# Patient Record
Sex: FEMALE | Race: WHITE | Employment: UNEMPLOYED | URBAN - METROPOLITAN AREA
[De-identification: names, ages, dates, MRNs, and addresses within clinical notes are randomized per-mention and may not be internally consistent; named-entity substitution may affect disease eponyms.]

---

## 2018-10-17 ENCOUNTER — HOSPITAL ENCOUNTER (OUTPATIENT)
Dept: MAMMOGRAPHY | Age: 43
Discharge: HOME | End: 2018-10-17
Attending: FAMILY MEDICINE
Payer: SELF-PAY

## 2018-10-17 DIAGNOSIS — Z12.31 VISIT FOR SCREENING MAMMOGRAM: ICD-10-CM

## 2018-10-17 PROCEDURE — 77067 SCR MAMMO BI INCL CAD: CPT

## 2020-02-24 ENCOUNTER — HOSPITAL ENCOUNTER (OUTPATIENT)
Dept: MAMMOGRAPHY | Age: 45
Discharge: HOME | End: 2020-02-24
Attending: NURSE PRACTITIONER
Payer: SELF-PAY

## 2020-02-24 DIAGNOSIS — Z12.31 VISIT FOR SCREENING MAMMOGRAM: ICD-10-CM

## 2020-02-24 PROCEDURE — 77063 BREAST TOMOSYNTHESIS BI: CPT

## 2021-10-07 ENCOUNTER — TRANSCRIBE ORDER (OUTPATIENT)
Dept: SCHEDULING | Age: 46
End: 2021-10-07

## 2021-10-07 DIAGNOSIS — Z12.31 ENCOUNTER FOR SCREENING MAMMOGRAM FOR MALIGNANT NEOPLASM OF BREAST: Primary | ICD-10-CM

## 2021-10-12 ENCOUNTER — TRANSCRIBE ORDER (OUTPATIENT)
Dept: REGISTRATION | Age: 46
End: 2021-10-12

## 2021-10-12 DIAGNOSIS — Z12.31 VISIT FOR SCREENING MAMMOGRAM: Primary | ICD-10-CM

## 2021-11-16 ENCOUNTER — HOSPITAL ENCOUNTER (OUTPATIENT)
Dept: MAMMOGRAPHY | Age: 46
Discharge: HOME | End: 2021-11-16
Attending: OBSTETRICS & GYNECOLOGY

## 2021-11-16 DIAGNOSIS — Z12.31 VISIT FOR SCREENING MAMMOGRAM: ICD-10-CM

## 2021-11-16 PROCEDURE — 77063 BREAST TOMOSYNTHESIS BI: CPT

## 2021-12-07 ENCOUNTER — HOSPITAL ENCOUNTER (OUTPATIENT)
Dept: MAMMOGRAPHY | Age: 46
Discharge: HOME | End: 2021-12-07
Attending: OBSTETRICS & GYNECOLOGY

## 2021-12-07 DIAGNOSIS — R92.8 ABNORMAL SCREENING MAMMOGRAM: ICD-10-CM

## 2021-12-07 PROCEDURE — 76642 ULTRASOUND BREAST LIMITED: CPT

## 2021-12-07 PROCEDURE — 77065 DX MAMMO INCL CAD UNI: CPT

## 2023-03-01 ENCOUNTER — OFFICE VISIT (OUTPATIENT)
Dept: FAMILY MEDICINE CLINIC | Facility: CLINIC | Age: 48
End: 2023-03-01

## 2023-03-01 VITALS
WEIGHT: 160.8 LBS | DIASTOLIC BLOOD PRESSURE: 72 MMHG | SYSTOLIC BLOOD PRESSURE: 100 MMHG | OXYGEN SATURATION: 99 % | HEIGHT: 69 IN | HEART RATE: 86 BPM | TEMPERATURE: 98.3 F | BODY MASS INDEX: 23.82 KG/M2 | RESPIRATION RATE: 18 BRPM

## 2023-03-01 DIAGNOSIS — R22.41 LOCALIZED SWELLING, MASS, OR LUMP OF RIGHT LOWER EXTREMITY: Primary | ICD-10-CM

## 2023-03-01 DIAGNOSIS — L98.9 SKIN LESIONS: ICD-10-CM

## 2023-03-01 DIAGNOSIS — E55.9 VITAMIN D DEFICIENCY: ICD-10-CM

## 2023-03-01 DIAGNOSIS — Z13.1 DIABETES MELLITUS SCREENING: ICD-10-CM

## 2023-03-01 DIAGNOSIS — N39.0 FREQUENT UTI: ICD-10-CM

## 2023-03-01 DIAGNOSIS — E04.1 THYROID NODULE: ICD-10-CM

## 2023-03-01 DIAGNOSIS — Z13.220 SCREENING CHOLESTEROL LEVEL: ICD-10-CM

## 2023-03-01 DIAGNOSIS — J30.89 ALLERGIC RHINITIS DUE TO OTHER ALLERGIC TRIGGER, UNSPECIFIED SEASONALITY: ICD-10-CM

## 2023-03-01 LAB
BASOPHILS # BLD: 0.1 K/UL (ref 0–0.2)
BASOPHILS NFR BLD: 1 % (ref 0–2)
DIFFERENTIAL METHOD BLD: NORMAL
EOSINOPHIL # BLD: 0.3 K/UL (ref 0–0.8)
EOSINOPHIL NFR BLD: 4 % (ref 0.5–7.8)
ERYTHROCYTE [DISTWIDTH] IN BLOOD BY AUTOMATED COUNT: 13.2 % (ref 11.9–14.6)
HCT VFR BLD AUTO: 40.6 % (ref 35.8–46.3)
HGB BLD-MCNC: 13.8 G/DL (ref 11.7–15.4)
IMM GRANULOCYTES # BLD AUTO: 0 K/UL (ref 0–0.5)
IMM GRANULOCYTES NFR BLD AUTO: 0 % (ref 0–5)
LYMPHOCYTES # BLD: 1.8 K/UL (ref 0.5–4.6)
LYMPHOCYTES NFR BLD: 27 % (ref 13–44)
MCH RBC QN AUTO: 30.1 PG (ref 26.1–32.9)
MCHC RBC AUTO-ENTMCNC: 34 G/DL (ref 31.4–35)
MCV RBC AUTO: 88.6 FL (ref 82–102)
MONOCYTES # BLD: 0.7 K/UL (ref 0.1–1.3)
MONOCYTES NFR BLD: 10 % (ref 4–12)
NEUTS SEG # BLD: 3.8 K/UL (ref 1.7–8.2)
NEUTS SEG NFR BLD: 58 % (ref 43–78)
NRBC # BLD: 0 K/UL (ref 0–0.2)
PLATELET # BLD AUTO: 221 K/UL (ref 150–450)
PMV BLD AUTO: 10.5 FL (ref 9.4–12.3)
RBC # BLD AUTO: 4.58 M/UL (ref 4.05–5.2)
WBC # BLD AUTO: 6.6 K/UL (ref 4.3–11.1)

## 2023-03-01 PROCEDURE — 99205 OFFICE O/P NEW HI 60 MIN: CPT | Performed by: FAMILY MEDICINE

## 2023-03-01 RX ORDER — NITROFURANTOIN MACROCRYSTALS 50 MG/1
50 CAPSULE ORAL PRN
Qty: 30 CAPSULE | Refills: 5 | Status: SHIPPED | OUTPATIENT
Start: 2023-03-01

## 2023-03-01 SDOH — ECONOMIC STABILITY: HOUSING INSECURITY
IN THE LAST 12 MONTHS, WAS THERE A TIME WHEN YOU DID NOT HAVE A STEADY PLACE TO SLEEP OR SLEPT IN A SHELTER (INCLUDING NOW)?: NO

## 2023-03-01 SDOH — ECONOMIC STABILITY: FOOD INSECURITY: WITHIN THE PAST 12 MONTHS, YOU WORRIED THAT YOUR FOOD WOULD RUN OUT BEFORE YOU GOT MONEY TO BUY MORE.: NEVER TRUE

## 2023-03-01 SDOH — ECONOMIC STABILITY: FOOD INSECURITY: WITHIN THE PAST 12 MONTHS, THE FOOD YOU BOUGHT JUST DIDN'T LAST AND YOU DIDN'T HAVE MONEY TO GET MORE.: NEVER TRUE

## 2023-03-01 SDOH — ECONOMIC STABILITY: INCOME INSECURITY: HOW HARD IS IT FOR YOU TO PAY FOR THE VERY BASICS LIKE FOOD, HOUSING, MEDICAL CARE, AND HEATING?: NOT HARD AT ALL

## 2023-03-01 ASSESSMENT — ANXIETY QUESTIONNAIRES
5. BEING SO RESTLESS THAT IT IS HARD TO SIT STILL: 0
2. NOT BEING ABLE TO STOP OR CONTROL WORRYING: 0
7. FEELING AFRAID AS IF SOMETHING AWFUL MIGHT HAPPEN: 0
4. TROUBLE RELAXING: 0
GAD7 TOTAL SCORE: 0
6. BECOMING EASILY ANNOYED OR IRRITABLE: 0
3. WORRYING TOO MUCH ABOUT DIFFERENT THINGS: 0
1. FEELING NERVOUS, ANXIOUS, OR ON EDGE: 0

## 2023-03-01 ASSESSMENT — ENCOUNTER SYMPTOMS
COUGH: 0
SHORTNESS OF BREATH: 0

## 2023-03-01 ASSESSMENT — PATIENT HEALTH QUESTIONNAIRE - PHQ9
1. LITTLE INTEREST OR PLEASURE IN DOING THINGS: 0
SUM OF ALL RESPONSES TO PHQ QUESTIONS 1-9: 0
SUM OF ALL RESPONSES TO PHQ9 QUESTIONS 1 & 2: 0
SUM OF ALL RESPONSES TO PHQ QUESTIONS 1-9: 0
2. FEELING DOWN, DEPRESSED OR HOPELESS: 0

## 2023-03-01 NOTE — PROGRESS NOTES
Tiara Luevano (: 1975) is a 50 y.o. female, new patient, here for evaluation of the following chief complaint(s):  New Patient       ASSESSMENT/PLAN:  1. Localized swelling, mass, or lump of right lower extremity  -     XR TIBIA FIBULA RIGHT (2 VIEWS); Future  2. Allergic rhinitis due to other allergic trigger, unspecified seasonality  3. Thyroid nodule  -     TSH; Future  4. Screening cholesterol level  -     Lipid Panel; Future  5. Diabetes mellitus screening  -     Hemoglobin A1C; Future  6. Vitamin D deficiency  -     Vitamin D 25 Hydroxy; Future  7. Frequent UTI  -     CBC with Auto Differential; Future  -     Comprehensive Metabolic Panel; Future  -     nitrofurantoin (MACRODANTIN) 50 MG capsule; Take 1 capsule by mouth as needed (UTI), Disp-30 capsule, R-5Normal  8. Skin lesions  -     External Referral to Dermatology    Has a localized protrusion, right fibula, will check x-ray first.  Will monitor closely. No pain or tenderness. We will check basic blood work, does follow with endocrinology for yearly ultrasounds to monitor thyroid nodule. But has not had her TSH checked in some time. Continue Macrobid as needed for frequent UTIs. Will refer to dermatology for skin check patient request.    Has upcoming colonoscopy, states up-to-date on mammograms and Pap smears per OB/GYN. Return if symptoms worsen or fail to improve. SUBJECTIVE/OBJECTIVE:  HPI    51-year-old overall female who presents to [facility] care. She has history of thyroid nodules that been biopsied in the past, she gets yearly ultrasounds to monitor this. She is on baby aspirin because she was seen by hematology and told she may have protein S deficiency, they are not 100% sure. She has had a full hysterectomy, but does get Pap smears with her OB/GYN still. She has an upcoming colonoscopy scheduled. She would like blood work checked today.     She has a history of ocular migraines, which were worse when she was younger. These seem to improve during her childbearing years but have now come back. They are very sporadic, her last one was about 3 weeks ago. She says she will go long time without 1 at all and then have multiple ones in the road. They usually resolve with some Excedrin. They are usually triggered by bright lights. She is getting some hot flashes, she did discuss with her OB/GYN going on hormones, she does not want to do that. She has a referral to dermatology, Dr. Mason Olivas. She has a lump on her right shin. This has been there for some time, has not grown is not painful. She never had evaluated before. She does need refill on her Macrobid, she does get frequent UTIs following sexual intercourse. She takes this as needed. PMH   has a past medical history of Allergic rhinitis, Oligomenorrhea, Protein S deficiency ([address] Thyroid cyst, and Thyroid nodule. Past Surgical History:   Procedure Laterality Date    APPENDECTOMY  2009    DILATION AND CURETTAGE OF UTERUS  1997    of uterus    GYN  2010    Hysteroscopy    OTHER SURGICAL HISTORY  1999, 2000,2002, 2004    Vaginal delivery    [address] [facility]. surgery       Current Outpatient Medications on File Prior to Visit   Medication Sig Dispense Refill    aspirin 81 MG EC tablet Take 81 mg by mouth daily      fexofenadine (ALLEGRA) 180 MG tablet Take by mouth       No current facility-administered medications on file prior to visit.        Social History     Socioeconomic History    Marital status:      Spouse name: Not on file    Number of children: Not on file    Years of education: Not on file    Highest education level: Not on file   Occupational History    Not on file   Tobacco Use    Smoking status: Never    Smokeless tobacco: Never   Substance and Sexual Activity    Alcohol use: No    Drug use: No    Sexual activity: Not on file   Other Topics Concern    Not on file   Social History Narrative    Not on file     Social Determinants of Health     Financial Resource Strain: Low Risk     Difficulty of Paying Living Expenses: Not hard at all   Food Insecurity: No Food Insecurity    Worried About Running Out of Food in the Last Year: Never true    Ran Out of Food in the Last Year: Never true   Transportation Needs: Unknown    Lack of Transportation (Medical): Not on file    Lack of Transportation (Non-Medical): No   Physical Activity: Not on file   Stress: Not on file   Social Connections: Not on file   Intimate Partner Violence: Not on file   Housing Stability: Unknown    Unable to Pay for Housing in the Last Year: Not on file    Number of Places Lived in the Last Year: Not on file    Unstable Housing in the Last Year: No       Family History   Problem Relation Age of Onset    Malig Hypertherm Neg Hx     Coronary Art Dis Maternal Grandfather     Coronary Art Dis Paternal Grandfather     Heart Failure Paternal Grandmother     Alzheimer's Disease Paternal Grandmother     Pseudochol. Deficiency Neg Hx     Delayed Awakening Neg Hx     Post-op Nausea/Vomiting Neg Hx     Emergence Delirium Neg Hx     Post-op Cognitive Dysfunction Neg Hx     Other Neg Hx     Thyroid Cancer Neg Hx     Breast Cancer Neg Hx     Migraines Mother     Elevated Lipids Father     Elevated Lipids Brother     No Known Problems Brother          ALLERGIES  Allergies   Allergen Reactions    Hydromorphone Shortness Of Breath    Sulfa Antibiotics Hives     Other reaction(s): Hives/Swelling-A       PREVIOUS PRIMARY CARE PROVIDER  Misty Reyes MD      RECORDS    Provided by patient:      Review of Systems   Constitutional:  Negative for activity change, appetite change, fever and unexpected weight change.   Respiratory:  Negative for cough and shortness of breath.    Cardiovascular:  Negative for chest pain and palpitations.   Skin:  Negative for rash.   Neurological:  Negative for dizziness and headaches.   Psychiatric/Behavioral:  Negative for sleep  disturbance. Physical Exam  Vitals reviewed. Constitutional:       General: She is not in acute distress. Appearance: Normal appearance. She is not ill-appearing or toxic-appearing. HENT:      Head: Normocephalic and atraumatic. Eyes:      Conjunctiva/sclera: Conjunctivae normal.   Cardiovascular:      Rate and Rhythm: Normal rate and regular rhythm. Heart sounds: Normal heart sounds. No murmur heard. No friction rub. No gallop. Pulmonary:      Effort: Pulmonary effort is normal. No respiratory distress. Breath sounds: Normal breath sounds. No wheezing, rhonchi or rales. Musculoskeletal:         General: No swelling. Normal range of motion. Legs:    Skin:     General: Skin is warm. Findings: No rash. Neurological:      Mental Status: She is alert. Mental status is at baseline. Psychiatric:         Mood and Affect: Mood normal.       Vitals:    03/01/23 1017   BP: 100/72   Pulse: 86   Resp: 18   Temp: 98.3 °F (36.8 °C)   SpO2: 99%        On this date, I spent 60 minutes reviewing previous notes, test results and face to face with the patient discussing the diagnosis and importance of compliance with the treatment plan as well as documenting on the day of the visit. No LOS data to display        An electronic signature was used to authenticate this note.   -- Pierrette Gaucher, MD

## 2023-03-02 LAB
25(OH)D3 SERPL-MCNC: 36.3 NG/ML (ref 30–100)
ALBUMIN SERPL-MCNC: 4.2 G/DL (ref 3.5–5)
ALBUMIN/GLOB SERPL: 1.2 (ref 0.4–1.6)
ALP SERPL-CCNC: 32 U/L (ref 50–136)
ALT SERPL-CCNC: 17 U/L (ref 12–65)
ANION GAP SERPL CALC-SCNC: 4 MMOL/L (ref 2–11)
AST SERPL-CCNC: 9 U/L (ref 15–37)
BILIRUB SERPL-MCNC: 0.6 MG/DL (ref 0.2–1.1)
BUN SERPL-MCNC: 14 MG/DL (ref 6–23)
CALCIUM SERPL-MCNC: 9.3 MG/DL (ref 8.3–10.4)
CHLORIDE SERPL-SCNC: 107 MMOL/L (ref 101–110)
CHOLEST SERPL-MCNC: 161 MG/DL
CO2 SERPL-SCNC: 25 MMOL/L (ref 21–32)
CREAT SERPL-MCNC: 0.8 MG/DL (ref 0.6–1)
EST. AVERAGE GLUCOSE BLD GHB EST-MCNC: 91 MG/DL
GLOBULIN SER CALC-MCNC: 3.6 G/DL (ref 2.8–4.5)
GLUCOSE SERPL-MCNC: 80 MG/DL (ref 65–100)
HBA1C MFR BLD: 4.8 % (ref 4.8–5.6)
HDLC SERPL-MCNC: 48 MG/DL (ref 40–60)
HDLC SERPL: 3.4
LDLC SERPL CALC-MCNC: 95 MG/DL
POTASSIUM SERPL-SCNC: 3.7 MMOL/L (ref 3.5–5.1)
PROT SERPL-MCNC: 7.8 G/DL (ref 6.3–8.2)
SODIUM SERPL-SCNC: 136 MMOL/L (ref 133–143)
TRIGL SERPL-MCNC: 90 MG/DL (ref 35–150)
TSH, 3RD GENERATION: 3 UIU/ML (ref 0.36–3.74)
VLDLC SERPL CALC-MCNC: 18 MG/DL (ref 6–23)

## 2023-03-06 ENCOUNTER — TELEPHONE (OUTPATIENT)
Dept: FAMILY MEDICINE CLINIC | Facility: CLINIC | Age: 48
End: 2023-03-06

## 2023-03-06 NOTE — TELEPHONE ENCOUNTER
----- Message from Chang Lockett MD sent at 3/6/2023  8:26 AM EST -----  Please call patient and let them know all of the resulting labs are normal and reassuring.

## 2023-03-06 NOTE — TELEPHONE ENCOUNTER
Contacted patient. Advised of results and provider comments. Verbalized understanding. No questions.      COURTNEY CHE

## 2023-04-26 ENCOUNTER — TELEPHONE (OUTPATIENT)
Dept: FAMILY MEDICINE CLINIC | Facility: CLINIC | Age: 48
End: 2023-04-26

## 2023-04-26 NOTE — TELEPHONE ENCOUNTER
Patient is having upper right quadrant pain. ECC called and stated the patient is looking for an urgent appointment. The MA spoke to the patient and informed her we do not have any urgent appointments at this time and informed to go to urgent care or the emergency department. She stated that pain was not too bad, that she has had this for a while  and can wait for an appointment.       Patient made an appointment for 5/1/23 for upper right quadrant pain

## 2023-05-01 ENCOUNTER — HOSPITAL ENCOUNTER (OUTPATIENT)
Dept: MAMMOGRAPHY | Age: 48
Discharge: HOME | End: 2023-05-04

## 2023-05-01 ENCOUNTER — OFFICE VISIT (OUTPATIENT)
Dept: FAMILY MEDICINE CLINIC | Facility: CLINIC | Age: 48
End: 2023-05-01

## 2023-05-01 VITALS
RESPIRATION RATE: 16 BRPM | WEIGHT: 159 LBS | OXYGEN SATURATION: 97 % | HEIGHT: 69 IN | TEMPERATURE: 98.8 F | BODY MASS INDEX: 23.55 KG/M2 | DIASTOLIC BLOOD PRESSURE: 70 MMHG | HEART RATE: 71 BPM | SYSTOLIC BLOOD PRESSURE: 120 MMHG

## 2023-05-01 DIAGNOSIS — Z12.31 ENCOUNTER FOR SCREENING MAMMOGRAM FOR MALIGNANT NEOPLASM OF BREAST: ICD-10-CM

## 2023-05-01 DIAGNOSIS — R10.31 COLICKY RLQ ABDOMINAL PAIN: Primary | ICD-10-CM

## 2023-05-01 DIAGNOSIS — Z87.442 HISTORY OF KIDNEY STONES: ICD-10-CM

## 2023-05-01 DIAGNOSIS — J30.1 SEASONAL ALLERGIC RHINITIS DUE TO POLLEN: ICD-10-CM

## 2023-05-01 LAB
BILIRUBIN, URINE, POC: NEGATIVE
BLOOD URINE, POC: NORMAL
GLUCOSE URINE, POC: NEGATIVE
KETONES, URINE, POC: NEGATIVE
LEUKOCYTE ESTERASE, URINE, POC: NEGATIVE
NITRITE, URINE, POC: NEGATIVE
PH, URINE, POC: 5 (ref 4.6–8)
PROTEIN,URINE, POC: NEGATIVE
SPECIFIC GRAVITY, URINE, POC: 1.02 (ref 1–1.03)
URINALYSIS CLARITY, POC: CLEAR
URINALYSIS COLOR, POC: YELLOW
UROBILINOGEN, POC: NORMAL

## 2023-05-01 PROCEDURE — 99214 OFFICE O/P EST MOD 30 MIN: CPT | Performed by: PHYSICIAN ASSISTANT

## 2023-05-01 PROCEDURE — 77067 SCR MAMMO BI INCL CAD: CPT

## 2023-05-01 PROCEDURE — 81003 URINALYSIS AUTO W/O SCOPE: CPT | Performed by: PHYSICIAN ASSISTANT

## 2023-05-01 RX ORDER — MONTELUKAST SODIUM 10 MG/1
10 TABLET ORAL DAILY
Qty: 90 TABLET | Refills: 1 | Status: SHIPPED | OUTPATIENT
Start: 2023-05-01

## 2023-05-01 RX ORDER — TAMSULOSIN HYDROCHLORIDE 0.4 MG/1
0.4 CAPSULE ORAL DAILY
Qty: 5 CAPSULE | Refills: 0 | Status: SHIPPED | OUTPATIENT
Start: 2023-05-01 | End: 2023-05-06

## 2023-07-17 ENCOUNTER — PATIENT MESSAGE (OUTPATIENT)
Dept: FAMILY MEDICINE CLINIC | Facility: CLINIC | Age: 48
End: 2023-07-17

## 2023-07-17 ENCOUNTER — OFFICE VISIT (OUTPATIENT)
Dept: FAMILY MEDICINE CLINIC | Facility: CLINIC | Age: 48
End: 2023-07-17
Payer: COMMERCIAL

## 2023-07-17 VITALS
HEART RATE: 81 BPM | SYSTOLIC BLOOD PRESSURE: 96 MMHG | BODY MASS INDEX: 23.31 KG/M2 | TEMPERATURE: 97.9 F | WEIGHT: 157.4 LBS | HEIGHT: 69 IN | OXYGEN SATURATION: 96 % | DIASTOLIC BLOOD PRESSURE: 58 MMHG

## 2023-07-17 DIAGNOSIS — K57.92 ACUTE DIVERTICULITIS: ICD-10-CM

## 2023-07-17 DIAGNOSIS — N83.201 CYST OF RIGHT OVARY: Primary | ICD-10-CM

## 2023-07-17 PROCEDURE — 99214 OFFICE O/P EST MOD 30 MIN: CPT | Performed by: FAMILY MEDICINE

## 2023-07-17 RX ORDER — KETOROLAC TROMETHAMINE 10 MG/1
10 TABLET, FILM COATED ORAL EVERY 6 HOURS PRN
COMMUNITY
Start: 2023-07-14

## 2023-07-17 RX ORDER — CIPROFLOXACIN 500 MG/1
TABLET, FILM COATED ORAL
COMMUNITY
Start: 2023-07-13

## 2023-07-17 RX ORDER — SODIUM, POTASSIUM,MAG SULFATES 17.5-3.13G
SOLUTION, RECONSTITUTED, ORAL ORAL
COMMUNITY
Start: 2023-04-26

## 2023-07-17 RX ORDER — AMOXICILLIN AND CLAVULANATE POTASSIUM 875; 125 MG/1; MG/1
TABLET, FILM COATED ORAL
COMMUNITY
Start: 2023-07-14

## 2023-07-17 ASSESSMENT — PATIENT HEALTH QUESTIONNAIRE - PHQ9
SUM OF ALL RESPONSES TO PHQ QUESTIONS 1-9: 0
SUM OF ALL RESPONSES TO PHQ9 QUESTIONS 1 & 2: 0
SUM OF ALL RESPONSES TO PHQ QUESTIONS 1-9: 0
1. LITTLE INTEREST OR PLEASURE IN DOING THINGS: 0
2. FEELING DOWN, DEPRESSED OR HOPELESS: 0

## 2023-07-17 NOTE — PROGRESS NOTES
Tiara Luevano (: 1975) is a 50 y.o. female, established patient, here for evaluation of the following chief complaint(s):  Follow-Up from Hospital (Diverticulitis/), Results (CT scan /), Referral - General (Has not heard from GI ), Lesion(s) (On liver and on bilateral kidney  right kidney has kidney (one stone)), and Cyst (Ovary right)       ASSESSMENT/PLAN:  1. Cyst of right ovary  -     [address] [place] [address]  2. Acute diverticulitis  -     External Referral To Gastroenterology    ER followup, patient seen today for follow-up emergency department for visit for diverticulitis,  Given Cipro and Flagyl, Flagyl cause dizziness, still taking Cipro, and Augmentin,  Ovarian cyst seen on CAT scan as well, will refer patient to GYN, as requested,  Referral to GI for diverticulitis follow-up,  Avoid certain foods such as nuts, berries, popcorn, drink plenty fluids, finish antibiotics. SUBJECTIVE/OBJECTIVE:  HPI  See above    Physical Exam  Vitals and nursing note reviewed. Constitutional:       General: She is not in acute distress. Appearance: Normal appearance. She is not ill-appearing. HENT:      Head: Normocephalic and atraumatic. Right Ear: External ear normal.      Left Ear: External ear normal.      Nose: Nose normal.      Mouth/Throat:      Mouth: Mucous membranes are dry. Eyes:      Extraocular Movements: Extraocular movements intact. Pupils: Pupils are equal, round, and reactive to light. Cardiovascular:      Rate and Rhythm: Normal rate. Pulses: Normal pulses. Pulmonary:      Effort: Pulmonary effort is normal.      Breath sounds: Normal breath sounds. Abdominal:      General: There is no distension. Musculoskeletal:         General: Normal range of motion. Cervical back: Normal range of motion and neck supple. Skin:     General: Skin is warm and dry. Neurological:      General: No focal deficit present.       Mental Status: She is

## 2023-07-18 DIAGNOSIS — N83.201 CYST OF RIGHT OVARY: Primary | ICD-10-CM

## 2023-08-01 ENCOUNTER — OFFICE VISIT (OUTPATIENT)
Dept: OBGYN CLINIC | Age: 48
End: 2023-08-01
Payer: COMMERCIAL

## 2023-08-01 VITALS
BODY MASS INDEX: 23.31 KG/M2 | HEIGHT: 69 IN | WEIGHT: 157.4 LBS | SYSTOLIC BLOOD PRESSURE: 100 MMHG | DIASTOLIC BLOOD PRESSURE: 60 MMHG

## 2023-08-01 DIAGNOSIS — N20.0 RENAL STONE: ICD-10-CM

## 2023-08-01 DIAGNOSIS — N83.201 CYST OF RIGHT OVARY: Primary | ICD-10-CM

## 2023-08-01 PROCEDURE — 99214 OFFICE O/P EST MOD 30 MIN: CPT | Performed by: OBSTETRICS & GYNECOLOGY

## 2023-08-01 PROCEDURE — 76830 TRANSVAGINAL US NON-OB: CPT | Performed by: OBSTETRICS & GYNECOLOGY

## 2023-08-01 NOTE — PROGRESS NOTES
Dimitris Singh  is a 50 y.o. female, No obstetric history on file. .  No LMP recorded. Patient has had a hysterectomy. , who is being seen for possible ovarian cyst.   She has been having rlq pain for a while now. She had bad pain and that is what lead to CT. She had diverticulitis. Now sill with rlq pain. Known kidney stone but never had a kidney stone pass. Does not have urologist.  Her Ct shows 3.4cm cyst.  Us today with 4cm ovary and 1.5 cm mass behind it. No solid masses or free fluid. TVUS today:   ROV cyst seen on CT  S/P hysterectomy   Bilateral adnexal WNL  LT ovary WNL  RT ovary larger than LT  ?mass posterior to ovary = 1.5 x 1.6 x 1.6 cm  Possible hemorrhagic exophytic cyst vs solid mass  There is significant vascularity noted to this area. HISTORY:    OB History          5    Para        Term                AB   1    Living   4         SAB   1    IAB        Ectopic        Molar        Multiple        Live Births              Obstetric Comments    x 4             GYN History         Sexual History:   Social History     Substance and Sexual Activity   Sexual Activity Not on file          has a past medical history of Allergic rhinitis, Diverticulitis, Oligomenorrhea, Protein S deficiency ([address]), Thyroid cyst, and Thyroid nodule.  .    Past Surgical History:   Procedure Laterality Date    APPENDECTOMY  2009    DILATION AND CURETTAGE OF UTERUS  1997    of uterus    GYN  2010    Hysteroscopy    HYSTERECTOMY (CERVIX STATUS UNKNOWN)          [address], ,,     Vaginal delivery    [address] [facility] surgery       Her current meds are:    Current Outpatient Medications:     montelukast (SINGULAIR) 10 MG tablet, Take 1 tablet by mouth daily (Patient taking differently: Take 1 tablet by mouth daily as needed), Disp: 90 tablet, Rfl: 1     Review of Systems  Neg except hpi       PHYSICAL EXAM:  /60   Ht 5' 9\" (1.753 m)   Wt 157 lb 6.4 oz

## 2023-08-01 NOTE — PROGRESS NOTES
Marco Whelan  is a 50 y.o. female, No obstetric history on file. .  No LMP recorded. Patient has had a hysterectomy. , who is being seen for possible ovarian cyst.     TVUS today:     HISTORY:    OB History    No obstetric history on file. GYN History         Sexual History:   Social History     Substance and Sexual Activity   Sexual Activity Not on file          has a past medical history of Allergic rhinitis, Diverticulitis, Oligomenorrhea, Protein S deficiency ([address]), Thyroid cyst, and Thyroid nodule.  .    Past Surgical History:   Procedure Laterality Date    APPENDECTOMY  2009    DILATION AND CURETTAGE OF UTERUS  1997    of uterus    GYN  2010    Hysteroscopy    HYSTERECTOMY (CERVIX STATUS UNKNOWN)      2016    OTHER SURGICAL HISTORY  1999, 2000,2002, 2004    Vaginal delivery    [address] [facility] surgery       Her current meds are:    Current Outpatient Medications:     amoxicillin-clavulanate (AUGMENTIN) 875-125 MG per tablet, TAKE 1 TABLET BY MOUTH TWICE DAILY FOR 7 DAYS, Disp: , Rfl:     ciprofloxacin (CIPRO) 500 MG tablet, One tablet two times a day for 10 days, Disp: , Rfl:     ketorolac (TORADOL) 10 MG tablet, Take 1 tablet by mouth every 6 hours as needed, Disp: , Rfl:     sodium-potassium-mag sulfate (SUPREP) 17.5-3.13-1.6 GM/177ML SOLN solution, TAKE PER COLONOSCOPY PREP INSTRUCTIONS, Disp: , Rfl:     tamsulosin (FLOMAX) 0.4 MG capsule, Take 1 capsule by mouth daily for 5 days (Patient not taking: Reported on 7/17/2023), Disp: 5 capsule, Rfl: 0    montelukast (SINGULAIR) 10 MG tablet, Take 1 tablet by mouth daily (Patient taking differently: Take 1 tablet by mouth daily as needed), Disp: 90 tablet, Rfl: 1    nitrofurantoin (MACRODANTIN) 50 MG capsule, Take 1 capsule by mouth as needed (UTI), Disp: 30 capsule, Rfl: 5    aspirin 81 MG EC tablet, Take 1 tablet by mouth daily, Disp: , Rfl:     fexofenadine (ALLEGRA) 180 MG tablet, Take by mouth as needed, Disp: , Rfl:      Review of

## 2023-09-13 ENCOUNTER — OFFICE VISIT (OUTPATIENT)
Dept: UROLOGY | Age: 48
End: 2023-09-13
Payer: COMMERCIAL

## 2023-09-13 VITALS
SYSTOLIC BLOOD PRESSURE: 98 MMHG | WEIGHT: 157 LBS | BODY MASS INDEX: 23.25 KG/M2 | DIASTOLIC BLOOD PRESSURE: 63 MMHG | OXYGEN SATURATION: 100 % | HEIGHT: 69 IN | HEART RATE: 68 BPM

## 2023-09-13 DIAGNOSIS — N20.0 KIDNEY STONE: ICD-10-CM

## 2023-09-13 LAB
BILIRUBIN, URINE, POC: NEGATIVE
BLOOD URINE, POC: NORMAL
GLUCOSE URINE, POC: NEGATIVE
KETONES, URINE, POC: NEGATIVE
LEUKOCYTE ESTERASE, URINE, POC: NEGATIVE
NITRITE, URINE, POC: NEGATIVE
PH, URINE, POC: 7.5 (ref 4.6–8)
PROTEIN,URINE, POC: NEGATIVE
SPECIFIC GRAVITY, URINE, POC: 1.02 (ref 1–1.03)
URINALYSIS CLARITY, POC: NORMAL
URINALYSIS COLOR, POC: NORMAL
UROBILINOGEN, POC: NORMAL

## 2023-09-13 PROCEDURE — 81003 URINALYSIS AUTO W/O SCOPE: CPT | Performed by: UROLOGY

## 2023-09-13 PROCEDURE — 74018 RADEX ABDOMEN 1 VIEW: CPT | Performed by: UROLOGY

## 2023-09-13 PROCEDURE — 99244 OFF/OP CNSLTJ NEW/EST MOD 40: CPT | Performed by: UROLOGY

## 2023-09-13 ASSESSMENT — ENCOUNTER SYMPTOMS
EYE DISCHARGE: 0
BACK PAIN: 1
ABDOMINAL PAIN: 1
CONSTIPATION: 0
SHORTNESS OF BREATH: 0

## 2023-09-13 NOTE — PROGRESS NOTES
[facility]rology  [address] [address]  [phone]    Grace Luevano  : 1975      Chief Complaint   Patient presents with    New Patient     Renal Stone evaluation. HPI   50 y.o., female who is referred by Dr. Pedro Bui for evaluation of a small R renal stone. Pt reports intermittent episodes of RLQ pain. Recently diagnosed with diverticulitis and received tx. She is planning to have colonoscopy. CT on 23 also showed a 3mm RLP renal stone. She states that as small stone was also seen on imaging in  at the time of her appendicitis. Reports rare R flank discomfort but denies hematuria. KUB today shows a 1-2mm RLP renal stone. No prior stone procedures. Takiing nitrofurantion prn after intercourse for recurrent UTI's with success. Past Medical History:   Diagnosis Date    Allergic rhinitis     Diverticulitis         Oligomenorrhea     Protein S deficiency ([address])     was diagnosed with protein S deficiency but no clinical manifestations    Thyroid cyst     Thyroid nodule      Past Surgical History:   Procedure Laterality Date    APPENDECTOMY  2009    DILATION AND CURETTAGE OF UTERUS  1997    of uterus    GYN  2010    Hysteroscopy    HYSTERECTOMY (CERVIX STATUS UNKNOWN)      2016    OTHER SURGICAL HISTORY  , ,,     Vaginal delivery    OTHER SURGICAL HISTORY      Eye surgery     Current Outpatient Medications   Medication Sig Dispense Refill    montelukast (SINGULAIR) 10 MG tablet Take 1 tablet by mouth daily (Patient taking differently: Take 1 tablet by mouth daily as needed) 90 tablet 1     No current facility-administered medications for this visit. Allergies   Allergen Reactions    Hydromorphone Shortness Of Breath    Sulfa Antibiotics Hives     Other reaction(s): Hives/Swelling-A    Metronidazole And Related      Very dizzy and shaky, heart racing and extremely lethargic.         Social History     Socioeconomic History

## 2023-10-03 NOTE — PROGRESS NOTES
The patient is a 50 y.o. [id number] who is seen for gyn ultrasound performed secondary to ovarian cyst. Her pain is gone and us is normal. Has seen urology. She is going to follow up with gi / have colonoscopy in [place] she believes. Today's ultrasound:   Vaginal cuff appears within normal limits   Uterus is surgically absent  ROV is visualized with follicles and appears within normal limits. Previously note cyst/mass not seen. LOV is visualized with follicles and appears within normal limits. No adnexal masses or fluid seen. U/S from 08/01/20232:  ROV cyst seen on CT  S/P hysterectomy   Bilateral adnexal WNL  LT ovary WNL  RT ovary larger than LT  ?mass posterior to ovary = 1.5 x 1.6 x 1.6 cm  Possible hemorrhagic exophytic cyst vs solid mass  There is significant vascularity noted to this area. HISTORY:  [id number]  No LMP recorded. Patient has had a hysterectomy. Sexual History:  has sex with males  Contraception:  status post hysterectomy  Current Outpatient Medications on File Prior to Visit   Medication Sig Dispense Refill    montelukast (SINGULAIR) 10 MG tablet Take 1 tablet by mouth daily (Patient taking differently: Take 1 tablet by mouth daily as needed) 90 tablet 1     No current facility-administered medications on file prior to visit. ROS:  Feeling well. No dyspnea or chest pain on exertion. No abdominal pain, change in bowel habits, black or bloody stools. No urinary tract symptoms. GYN ROS: neg. PHYSICAL EXAM:  Blood pressure 102/60, height 5' 9\" (1.753 m), weight 162 lb 11.2 oz (73.8 kg). The patient appears well, alert, oriented x 3, in no distress. .    ASSESSMENT:  Encounter Diagnosis   Name Primary? Cyst of right ovary Yes       PLAN:  Cyst has resolved. Rtc prn. Orders Placed This Encounter   Procedures    AMB POC US, TRANSVAGINAL     Order Specific Question:   Are you Pregnant? Answer:    No

## 2023-10-05 ENCOUNTER — OFFICE VISIT (OUTPATIENT)
Dept: OBGYN CLINIC | Age: 48
End: 2023-10-05

## 2023-10-05 VITALS
HEIGHT: 69 IN | SYSTOLIC BLOOD PRESSURE: 102 MMHG | BODY MASS INDEX: 24.1 KG/M2 | WEIGHT: 162.7 LBS | DIASTOLIC BLOOD PRESSURE: 60 MMHG

## 2023-10-05 DIAGNOSIS — N83.201 CYST OF RIGHT OVARY: Primary | ICD-10-CM

## 2024-02-08 ENCOUNTER — PATIENT MESSAGE (OUTPATIENT)
Dept: FAMILY MEDICINE CLINIC | Facility: CLINIC | Age: 49
End: 2024-02-08

## 2024-02-08 DIAGNOSIS — R22.41 LOCALIZED SWELLING, MASS, OR LUMP OF RIGHT LOWER EXTREMITY: ICD-10-CM

## 2024-02-08 NOTE — TELEPHONE ENCOUNTER
From: Tiara Luevano  To: Dr. Alisha Tobar  Sent: 2/8/2024 3:12 PM EST  Subject: XRay     I had an XRay done on 2/2/2024 at [facility] on [address] and had the results sent to Misty Reyes but I don’t see that she is an option to send a message to.   I don’t see the XRay in my chart either. Would like to know if you all received the XRay results?

## 2024-07-02 ENCOUNTER — TRANSCRIBE ORDERS (OUTPATIENT)
Dept: SCHEDULING | Age: 49
End: 2024-07-02

## 2024-07-02 DIAGNOSIS — Z12.31 SCREENING MAMMOGRAM FOR HIGH-RISK PATIENT: Primary | ICD-10-CM

## 2024-07-08 ENCOUNTER — OFFICE VISIT (OUTPATIENT)
Dept: ORTHOPEDIC SURGERY | Age: 49
End: 2024-07-08
Payer: COMMERCIAL

## 2024-07-08 VITALS — HEIGHT: 69 IN | BODY MASS INDEX: 23.7 KG/M2 | WEIGHT: 160 LBS

## 2024-07-08 DIAGNOSIS — M22.41 CHONDROMALACIA OF PATELLOFEMORAL JOINT, RIGHT: ICD-10-CM

## 2024-07-08 DIAGNOSIS — M25.561 RIGHT KNEE PAIN, UNSPECIFIED CHRONICITY: Primary | ICD-10-CM

## 2024-07-08 PROCEDURE — 99203 OFFICE O/P NEW LOW 30 MIN: CPT | Performed by: PHYSICIAN ASSISTANT

## 2024-07-08 NOTE — PROGRESS NOTES
Name: Tiara Luevano  YOB: 1975  Gender: female  MRN: [id number]    CC: Knee Pain (R)     HPI: Tiara Luevano is a 49 y.o. female who presents with Knee Pain (R)  She played soccer when she was younger. She has noticed pain recently when she is going up stairs, carrying heavy things, or exercising. She has also noticed some crackling sounds with stairs. She does not have pain with normal, every day walking.  She states most of her pain is in the front of her knee and is worse when her knee is bent for long periods of time.  She does notice stretching of the knee relieves some of her pain.  She would like to make sure everything is ok before she continues with more strenuous activity.  She is here today for further evaluation of this right knee pain.    ROS/Meds/PSH/PMH/FH/SH: I personally reviewed the patients standard intake form.  Below are the pertinents    Tobacco:  reports that she has never smoked. She has never used smokeless tobacco.  Diabetes: none  Other: none    Physical Examination:  General: no acute distress  Lungs: breathing easily  CV: regular rhythm by pulse  Right Knee: No previous surgical scars present. No joint effusion present. Patella tracks centrally with 2+ patellofemoral grind. Neutral mechanical alignment present. Passive ROM: 5 degrees of hyperextension with 120 degrees of flexion. Stable to varus and valgus stress at full extension and 30 degrees of flexion. Negative Lachman's. negative anterior drawer. negative posterior drawer. No pain with McMurrays over medial or lateral joint line. Not Tender to palpate over both Medial and Lateral joint line. negative Apprehension test.  Calf is soft and nontender to palpation. Motor and sensory intact distally. Dorsalis pedis pulse 2+.      Imaging:   Knee XR: 4 views     Clinical Indication  1. Right knee pain, unspecified chronicity    2. Chondromalacia of patellofemoral joint, right           Report: AP, lateral, PA

## 2024-07-16 ENCOUNTER — PATIENT MESSAGE (OUTPATIENT)
Dept: ORTHOPEDIC SURGERY | Age: 49
End: 2024-07-16

## 2024-08-15 ENCOUNTER — HOSPITAL ENCOUNTER (OUTPATIENT)
Dept: MAMMOGRAPHY | Age: 49
Discharge: HOME | End: 2024-08-15
Payer: COMMERCIAL

## 2024-08-15 DIAGNOSIS — Z12.31 SCREENING MAMMOGRAM FOR HIGH-RISK PATIENT: ICD-10-CM

## 2024-08-15 PROCEDURE — 77063 BREAST TOMOSYNTHESIS BI: CPT

## 2025-02-04 ENCOUNTER — OFFICE VISIT (OUTPATIENT)
Dept: NEUROLOGY | Age: 50
End: 2025-02-04
Payer: COMMERCIAL

## 2025-02-04 VITALS
HEART RATE: 85 BPM | BODY MASS INDEX: 24.66 KG/M2 | OXYGEN SATURATION: 98 % | WEIGHT: 167 LBS | SYSTOLIC BLOOD PRESSURE: 124 MMHG | DIASTOLIC BLOOD PRESSURE: 79 MMHG

## 2025-02-04 DIAGNOSIS — M62.838 MUSCLE SPASM: ICD-10-CM

## 2025-02-04 DIAGNOSIS — M62.838 MUSCLE SPASM: Primary | ICD-10-CM

## 2025-02-04 DIAGNOSIS — R53.1 GENERALIZED WEAKNESS: ICD-10-CM

## 2025-02-04 LAB
CK SERPL-CCNC: 467 U/L (ref 21–215)
ERYTHROCYTE [SEDIMENTATION RATE] IN BLOOD: <1 MM/HR (ref 0–20)
FERRITIN SERPL-MCNC: 55 NG/ML (ref 8–388)
FOLATE SERPL-MCNC: 14.6 NG/ML (ref 3.1–17.5)
VIT B12 SERPL-MCNC: 458 PG/ML (ref 193–986)

## 2025-02-04 PROCEDURE — 99203 OFFICE O/P NEW LOW 30 MIN: CPT | Performed by: PSYCHIATRY & NEUROLOGY

## 2025-02-04 RX ORDER — MAGNESIUM OXIDE 400 MG/1
400 TABLET ORAL DAILY
COMMUNITY

## 2025-02-04 RX ORDER — LEVOTHYROXINE SODIUM 25 UG/1
25 TABLET ORAL DAILY
COMMUNITY
Start: 2024-07-25

## 2025-02-04 RX ORDER — FEXOFENADINE HCL 180 MG/1
180 TABLET ORAL DAILY
COMMUNITY

## 2025-02-04 NOTE — PATIENT INSTRUCTIONS
Blood work today. You can get this done on the first floor (right off the elevator and down the hallway on the right)    Will arrange an EEG which records her brain wounds for approximately 30 minutes.  This can be scheduled up front.

## 2025-02-04 NOTE — PROGRESS NOTES
[facility] NEUROLOGY  [address], [address]  [place] [address]  Phone: [phone] Fax [phone]  Adarsh Garcia MD      Patient: Tiara Luevano  Provider: Adarsh Garcia MD    CC:   Chief Complaint   Patient presents with    New Patient     Body convulsions at night  Involuntary body movements when resting  Hypersensitivity      Referring Provider:    History of Present Illness:     Tiara Luevano is a 50 y.o. RH female who presents for evaluation of involuntary movements.     She is unaccompanied for today's visit.     Patient presents for further evaluation of abnormal involuntary movements.  Movements of been described as involuntary muscle contractions or muscle spasms.  These are very brief involuntary contractions, particularly in the abdomen, but also present in any of the extremities.  They are not particularly painful with it can be disruptive to sleep and apparently wake her up out of sleep.    She has been taking magnesium supplements which have helped reduce the severity of these movements but they are still occurring.  She continues to describe brief myoclonic jerking like movements of random parts of her body, particularly at rest and worse at night.    She has noted some other symptoms such as a tendency to drop items from her hands but denies any numbness or paresthesias or any focal weakness.  She does feel some perception of weakness in the upper arms and she has a recurring pattern of chronic neck pain and stiffness.  She has noted some occasional word finding issues but there does not appear to be any other significant cognitive concerns.      Review of Systems:   Review of Systems   Constitutional:  Negative for fever.   HENT:  Negative for voice change.    Eyes:  Negative for visual disturbance.   Respiratory:  Negative for shortness of breath.    Cardiovascular:  Negative for chest pain.   Gastrointestinal:  Negative for abdominal pain.   Musculoskeletal:  Positive for

## 2025-02-05 ASSESSMENT — ENCOUNTER SYMPTOMS
SHORTNESS OF BREATH: 0
ABDOMINAL PAIN: 0
VOICE CHANGE: 0

## 2025-02-07 ENCOUNTER — PATIENT MESSAGE (OUTPATIENT)
Dept: NEUROLOGY | Age: 50
End: 2025-02-07

## 2025-02-07 DIAGNOSIS — R76.8 ELEVATED ANTINUCLEAR ANTIBODY (ANA) LEVEL: Primary | ICD-10-CM

## 2025-02-07 LAB
ANA SER QL: POSITIVE
CENTROMERE B AB SER-ACNC: <0.2 AI (ref 0–0.9)
CHROMATIN AB SERPL-ACNC: <0.2 AI (ref 0–0.9)
DSDNA AB SER-ACNC: 25 IU/ML (ref 0–9)
ENA JO1 AB SER-ACNC: <0.2 AI (ref 0–0.9)
ENA RNP AB SER-ACNC: <0.2 AI (ref 0–0.9)
ENA SCL70 AB SER-ACNC: <0.2 AI (ref 0–0.9)
ENA SM AB SER-ACNC: <0.2 AI (ref 0–0.9)
ENA SS-A AB SER-ACNC: <0.2 AI (ref 0–0.9)
ENA SS-B AB SER-ACNC: 0.4 AI (ref 0–0.9)
Lab: ABNORMAL

## 2025-02-12 ENCOUNTER — PROCEDURE VISIT (OUTPATIENT)
Dept: NEUROLOGY | Age: 50
End: 2025-02-12
Payer: COMMERCIAL

## 2025-02-12 VITALS — OXYGEN SATURATION: 97 % | HEART RATE: 96 BPM | HEIGHT: 69 IN | BODY MASS INDEX: 24.66 KG/M2

## 2025-02-12 DIAGNOSIS — M62.838 MUSCLE SPASM: Primary | ICD-10-CM

## 2025-02-12 DIAGNOSIS — R53.1 GENERALIZED WEAKNESS: ICD-10-CM

## 2025-02-12 PROCEDURE — 95911 NRV CNDJ TEST 9-10 STUDIES: CPT | Performed by: PSYCHIATRY & NEUROLOGY

## 2025-02-12 PROCEDURE — 95885 MUSC TST DONE W/NERV TST LIM: CPT | Performed by: PSYCHIATRY & NEUROLOGY

## 2025-02-12 NOTE — PROGRESS NOTES
EMG/Nerve Conduction Study Procedure Note  [address]    [address]  [place]  [address]   [phone]      Hx:    Exam:     50 y.o.  mw female     EMG::   BLE.   Noted hx sleep onset myoclonus.   Hx inc CK to 400's.   \"Spasms\" mucles and weakness bilateral lowers and feet.  No pes cavus or deformities.  No paresthesias.  No dyskinesia.  He has difficulty sleeping because of the movements which increased as she is initiating sleep.  No definitive altered mental status seizure.  No clonus or fasciculations.  No spasticity.  No Babinski.  Referring:   Dr. Adarsh Garcia  Technologist ::   Kenn Esquivel  Hgt:     5 foot 9 inch        Summary    needle EMG selected lower extremity muscles with CV studies.                  Controlled environmental factors / EMG lab.  Temperature.   NCV : sensory segments:       Normal bilateral sural SCV.     NCV plantar sensory segments:    Normal bilateral plantar SCV.      NCV Motor MCV segments:    Normal bilateral peroneal tibial MCV.         F-wave studies:      Normal bilateral peroneal tibial F-wave latencies.     H-REFLEX Studies:    Normal bilateral H-reflexes.   NEEDLE EMG:   Tested muscles::     Normal bilateral TA MG VL muscle testing without any abnormal MUP, fibrillation positive sharp waves fasciculations myotonia etc.  Normal recruitment.               INTERPRETATION:             INTERPRETATION:       NORMAL STUDIES.     THERE IS NO ELECTROPHYSIOLOGIC EVIDENCE OF NEUROPATHY, DENERVATION, RADICULOPATHY, PLEXOPATHY, MYOPATHY, MYOTONIA, OR FASCICULATIONS IN THE TESTED SEGMENTS  OF   LOWER  EXTREMITIES.            CONCLUSION:      NORMAL  LOWER STUDIES.        Procedure Details:       FURTHER clinical correlation is recommended and warranted as studies are within normal limits.                                                             Patient is made aware.              Please Note::     Data and waveforms * filed under Procedure.    See Procedure Files for data

## 2025-02-14 ENCOUNTER — OFFICE VISIT (OUTPATIENT)
Dept: ENDOCRINOLOGY | Age: 50
End: 2025-02-14
Payer: COMMERCIAL

## 2025-02-14 VITALS
HEART RATE: 78 BPM | SYSTOLIC BLOOD PRESSURE: 122 MMHG | OXYGEN SATURATION: 99 % | WEIGHT: 165 LBS | DIASTOLIC BLOOD PRESSURE: 80 MMHG | HEIGHT: 69 IN | BODY MASS INDEX: 24.44 KG/M2

## 2025-02-14 DIAGNOSIS — E04.1 THYROID CYST: ICD-10-CM

## 2025-02-14 DIAGNOSIS — R45.1 AGITATION: ICD-10-CM

## 2025-02-14 DIAGNOSIS — E04.1 THYROID NODULE: ICD-10-CM

## 2025-02-14 DIAGNOSIS — N95.1 PERIMENOPAUSE: ICD-10-CM

## 2025-02-14 DIAGNOSIS — E04.1 THYROID NODULE: Primary | ICD-10-CM

## 2025-02-14 PROCEDURE — 76536 US EXAM OF HEAD AND NECK: CPT | Performed by: INTERNAL MEDICINE

## 2025-02-14 PROCEDURE — 99214 OFFICE O/P EST MOD 30 MIN: CPT | Performed by: INTERNAL MEDICINE

## 2025-02-14 ASSESSMENT — ENCOUNTER SYMPTOMS
TROUBLE SWALLOWING: 1
CONSTIPATION: 1
VOICE CHANGE: 0

## 2025-02-14 NOTE — PROGRESS NOTES
Status:   Future     Standing Expiration Date:   2/14/2026    TSH     Standing Status:   Future     Standing Expiration Date:   2/14/2026    T4, Free     Standing Status:   Future     Standing Expiration Date:   2/14/2026       Current Outpatient Medications   Medication Sig Dispense Refill    fexofenadine (ALLEGRA) 180 MG tablet Take 1 tablet by mouth daily      levothyroxine (SYNTHROID) 25 MCG tablet Take 1 tablet by mouth Daily      magnesium oxide (MAG-OX) 400 MG tablet Take 1 tablet by mouth daily      montelukast (SINGULAIR) 10 MG tablet Take 1 tablet by mouth daily (Patient not taking: Reported on 7/8/2024) 90 tablet 1     No current facility-administered medications for this visit.       JAVI Kaye MD, FACE      Portions of this note were generated with the assistance of voice recognition software.  As such, some errors in transcription may be present.

## 2025-02-15 LAB
ALBUMIN SERPL-MCNC: 4.4 G/DL (ref 3.9–4.9)
ALP SERPL-CCNC: 37 IU/L (ref 44–121)
ALT SERPL-CCNC: 9 IU/L (ref 0–32)
AST SERPL-CCNC: 13 IU/L (ref 0–40)
BASOPHILS # BLD AUTO: 0.1 X10E3/UL (ref 0–0.2)
BASOPHILS NFR BLD AUTO: 1 %
BILIRUB SERPL-MCNC: 0.5 MG/DL (ref 0–1.2)
BUN SERPL-MCNC: 11 MG/DL (ref 6–24)
BUN/CREAT SERPL: 14 (ref 9–23)
CALCIUM SERPL-MCNC: 9.4 MG/DL (ref 8.7–10.2)
CHLORIDE SERPL-SCNC: 103 MMOL/L (ref 96–106)
CO2 SERPL-SCNC: 24 MMOL/L (ref 20–29)
CREAT SERPL-MCNC: 0.76 MG/DL (ref 0.57–1)
EGFRCR SERPLBLD CKD-EPI 2021: 95 ML/MIN/1.73
EOSINOPHIL # BLD AUTO: 0.2 X10E3/UL (ref 0–0.4)
EOSINOPHIL NFR BLD AUTO: 3 %
ERYTHROCYTE [DISTWIDTH] IN BLOOD BY AUTOMATED COUNT: 13.1 % (ref 11.7–15.4)
ESTRADIOL SERPL-MCNC: 31.8 PG/ML
FSH SERPL-ACNC: 8.9 MIU/ML
GLOBULIN SER CALC-MCNC: 2.7 G/DL (ref 1.5–4.5)
GLUCOSE SERPL-MCNC: 79 MG/DL (ref 70–99)
HCT VFR BLD AUTO: 40.6 % (ref 34–46.6)
HGB BLD-MCNC: 13.3 G/DL (ref 11.1–15.9)
IMM GRANULOCYTES # BLD AUTO: 0 X10E3/UL (ref 0–0.1)
IMM GRANULOCYTES NFR BLD AUTO: 0 %
LYMPHOCYTES # BLD AUTO: 2 X10E3/UL (ref 0.7–3.1)
LYMPHOCYTES NFR BLD AUTO: 30 %
MCH RBC QN AUTO: 29.9 PG (ref 26.6–33)
MCHC RBC AUTO-ENTMCNC: 32.8 G/DL (ref 31.5–35.7)
MCV RBC AUTO: 91 FL (ref 79–97)
MONOCYTES # BLD AUTO: 0.8 X10E3/UL (ref 0.1–0.9)
MONOCYTES NFR BLD AUTO: 12 %
NEUTROPHILS # BLD AUTO: 3.7 X10E3/UL (ref 1.4–7)
NEUTROPHILS NFR BLD AUTO: 54 %
PLATELET # BLD AUTO: 238 X10E3/UL (ref 150–450)
POTASSIUM SERPL-SCNC: 4.2 MMOL/L (ref 3.5–5.2)
PROT SERPL-MCNC: 7.1 G/DL (ref 6–8.5)
RBC # BLD AUTO: 4.45 X10E6/UL (ref 3.77–5.28)
SODIUM SERPL-SCNC: 139 MMOL/L (ref 134–144)
T4 FREE SERPL-MCNC: 1.34 NG/DL (ref 0.82–1.77)
TSH SERPL DL<=0.005 MIU/L-ACNC: 2.19 UIU/ML (ref 0.45–4.5)
WBC # BLD AUTO: 6.8 X10E3/UL (ref 3.4–10.8)

## 2025-02-17 ENCOUNTER — PATIENT MESSAGE (OUTPATIENT)
Dept: ENDOCRINOLOGY | Age: 50
End: 2025-02-17

## 2025-02-26 ENCOUNTER — PROCEDURE VISIT (OUTPATIENT)
Dept: NEUROLOGY | Age: 50
End: 2025-02-26

## 2025-02-26 DIAGNOSIS — M62.838 MUSCLE SPASM: Primary | ICD-10-CM

## 2025-02-26 NOTE — PROGRESS NOTES
ELECTROENCEPHALOGRAM FOR [facility] NEUROLOGY    EEG: Routine  Pt Class: Outpatient    DATE(s) OF EE25  DATE OF REPORT: 25    MRN: [id number]  YOB: 1975  EEG No.   ICD-10: R25.9 - Unspecified abnormal involuntary movements  CPT Code: 59489 (20-40 minutes, awake and drowsy)  CSN: [id number]    HISTORY: body convulsions at night, seizure-like activity    MEDICATIONS THAT COULD AFFECT EEG: no ASM    TECHNICAL SUMMARY  This is a digital EEG recorded with all input channels reviewed with bipolar and referential montages using the modified combinatorial system nomenclature.    DESCRIPTION OF RECORD AND EVENTS  During the maximally alert state a 10.5 Hz posterior dominant rhythm was seen that was symmetric, reactive to eye opening and well regulated. More anteriorly, low voltage frontocentral beta predominated. Minimal myogenic artifact was seen with corresponding movements. Drowsiness was characterized by alpha attenuation and increased symmetric frontocentral theta activity. Vertex sharp transients were seen. Stage 2 sleep was not reached.     HV:  Hyperventilation was performed for 3 minutes with good effort. No pathologic change was seen with HV.    PHOTIC STIMULATION: Photic stimulation was done from 1-21 Hz; no photic driving was seen; photoparoxysmal responses were absent.    ELECTROCARDIOGRAM: Normal Sinus Rhythm     IMPRESSION: Normal EEG in Awake and Drowsy states.    CLINICAL CORRELATION: This EEG is normal for a patient of this age during awake and drowsy states. There are no asymmetries, epileptiform discharges or electrographic seizures.     An EEG without epileptiform discharges does not exclude the possibility of epilepsy. If the clinical suspicion of epilepsy remains, consider additional EEG recordings.      Vaibhav Martinez MD  [facility] Neurology  [address], [facility] [address]  [place] [address]  Phone: [phone]  Fax:

## 2025-03-10 SDOH — ECONOMIC STABILITY: FOOD INSECURITY: WITHIN THE PAST 12 MONTHS, THE FOOD YOU BOUGHT JUST DIDN'T LAST AND YOU DIDN'T HAVE MONEY TO GET MORE.: NEVER TRUE

## 2025-03-10 SDOH — ECONOMIC STABILITY: FOOD INSECURITY: WITHIN THE PAST 12 MONTHS, YOU WORRIED THAT YOUR FOOD WOULD RUN OUT BEFORE YOU GOT MONEY TO BUY MORE.: NEVER TRUE

## 2025-03-10 SDOH — ECONOMIC STABILITY: TRANSPORTATION INSECURITY
IN THE PAST 12 MONTHS, HAS THE LACK OF TRANSPORTATION KEPT YOU FROM MEDICAL APPOINTMENTS OR FROM GETTING MEDICATIONS?: NO

## 2025-03-10 SDOH — ECONOMIC STABILITY: INCOME INSECURITY: IN THE LAST 12 MONTHS, WAS THERE A TIME WHEN YOU WERE NOT ABLE TO PAY THE MORTGAGE OR RENT ON TIME?: NO

## 2025-03-10 SDOH — ECONOMIC STABILITY: TRANSPORTATION INSECURITY
IN THE PAST 12 MONTHS, HAS LACK OF TRANSPORTATION KEPT YOU FROM MEETINGS, WORK, OR FROM GETTING THINGS NEEDED FOR DAILY LIVING?: NO

## 2025-03-12 NOTE — PROGRESS NOTES
HPI:  Ms. Luevano is a 50 y.o. female  who is here today for a well woman exam. She complains of nothing.   Having some twitching - seen many specialists.  Saw endocrine.  Saw rheumatologist.  She had hormones checked and fsh was premenopausal.  Having hot flashes at night /midsection weight gain /fatigue.  Discussed could be perimenopausal change. She did have some abnl thyroid testing and elevated alize.         Date Performed Result   PAP Hysterectomy  (cervix removed)    Mammogram 08/15/2024 Stable mammogram without suspicious findings   Colonoscopy 2024 A.  Stomach, biopsy:  Fragments of gastric mucosa showing no significant diagnostic abnormality.     B, C.  Esophagus (distal, proximal), biopsies:  Fragments of squamous mucosa showing no significant diagnostic abnormality.     D.  Cecal and ascending colon polyps, biopsies:  Fragments of serrated polyp, favor sessile serrated adenoma.  Fragments of unremarkable colonic mucosa.   Dexa         OB History          5    Para   4    Term   4            AB   1    Living   4         SAB   1    IAB        Ectopic        Molar        Multiple        Live Births   4          Obstetric Comments    x 4           Oldest - 2 grandbabies.  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   -  - [employer] - .    4th - 19yo - jr at [employer].  Nursing   GYN History     No LMP recorded. Patient has had a hysterectomy.  negative postcoital bleeding        Past Medical History:   Diagnosis Date    Allergic rhinitis     Autoimmune disorder 2025    Still trying to figure it out    Complication of anesthesia 2016    Delay in waking up    Diverticulitis         Oligomenorrhea     Protein S deficiency     Thyroid cyst     Thyroid nodule      Past Surgical History:   Procedure Laterality Date    APPENDECTOMY      COLONOSCOPY      DILATION AND CURETTAGE OF UTERUS      ESOPHAGEAL DILATATION      EYE SURGERY      HYSTERECTOMY (CERVIX

## 2025-03-13 ENCOUNTER — OFFICE VISIT (OUTPATIENT)
Dept: OBGYN CLINIC | Age: 50
End: 2025-03-13
Payer: COMMERCIAL

## 2025-03-13 VITALS
HEIGHT: 69 IN | WEIGHT: 163.8 LBS | DIASTOLIC BLOOD PRESSURE: 66 MMHG | SYSTOLIC BLOOD PRESSURE: 108 MMHG | BODY MASS INDEX: 24.26 KG/M2

## 2025-03-13 DIAGNOSIS — J30.1 SEASONAL ALLERGIC RHINITIS DUE TO POLLEN: ICD-10-CM

## 2025-03-13 DIAGNOSIS — Z13.89 SCREENING FOR GENITOURINARY CONDITION: ICD-10-CM

## 2025-03-13 DIAGNOSIS — R53.83 OTHER FATIGUE: ICD-10-CM

## 2025-03-13 DIAGNOSIS — Z01.419 WELL WOMAN EXAM: Primary | ICD-10-CM

## 2025-03-13 DIAGNOSIS — Z12.31 SCREENING MAMMOGRAM FOR BREAST CANCER: ICD-10-CM

## 2025-03-13 LAB
BILIRUBIN, URINE, POC: NEGATIVE
BLOOD URINE, POC: NORMAL
GLUCOSE URINE, POC: NEGATIVE
KETONES, URINE, POC: NEGATIVE
LEUKOCYTE ESTERASE, URINE, POC: NEGATIVE
NITRITE, URINE, POC: NORMAL
PH, URINE, POC: 7 (ref 4.6–8)
PROGEST SERPL-MCNC: 2.39 NG/ML
PROTEIN,URINE, POC: NEGATIVE
SPECIFIC GRAVITY, URINE, POC: 1.01 (ref 1–1.03)
URINALYSIS CLARITY, POC: CLEAR
URINALYSIS COLOR, POC: YELLOW
UROBILINOGEN, POC: NORMAL MG/DL

## 2025-03-13 PROCEDURE — 99396 PREV VISIT EST AGE 40-64: CPT | Performed by: OBSTETRICS & GYNECOLOGY

## 2025-03-13 PROCEDURE — 81002 URINALYSIS NONAUTO W/O SCOPE: CPT | Performed by: OBSTETRICS & GYNECOLOGY

## 2025-03-13 RX ORDER — MONTELUKAST SODIUM 10 MG/1
10 TABLET ORAL DAILY
Qty: 90 TABLET | Refills: 1 | Status: SHIPPED | OUTPATIENT
Start: 2025-03-13

## 2025-03-13 RX ORDER — NITROFURANTOIN 25; 75 MG/1; MG/1
100 CAPSULE ORAL 2 TIMES DAILY
COMMUNITY

## 2025-03-13 RX ORDER — NITROFURANTOIN MACROCRYSTALS 50 MG/1
50 CAPSULE ORAL PRN
Qty: 30 CAPSULE | Refills: 5 | Status: SHIPPED | OUTPATIENT
Start: 2025-03-13 | End: 2025-03-23

## 2025-03-13 RX ORDER — NITROFURANTOIN MACROCRYSTALS 50 MG/1
50 CAPSULE ORAL 4 TIMES DAILY
COMMUNITY
End: 2025-03-13

## 2025-03-14 ENCOUNTER — RESULTS FOLLOW-UP (OUTPATIENT)
Dept: OBGYN CLINIC | Age: 50
End: 2025-03-14

## 2025-03-20 LAB — TESTOST FREE SERPL-MCNC: 0.3 PG/ML (ref 0–4.2)

## 2025-03-25 NOTE — TELEPHONE ENCOUNTER
----- Message from Dr. Andra Hand DO sent at 3/25/2025  1:55 PM EDT -----  Free testosterone is low.  This can be linked to libido/energy.  Consider oral 1mg jyothi or topical cream. Could cause hair growth / acne /alteration in liver function.  This dose is not really high enough to do that.

## 2025-03-26 NOTE — TELEPHONE ENCOUNTER
Called in testosterone 1mg topical cream. (2mg/mL 2 clicks per day) with 5 refills    ----- Message from Dr. Andra Hand, DO sent at 3/25/2025  1:55 PM EDT -----  Free testosterone is low.  This can be linked to libido/energy.  Consider oral 1mg jyothi or topical cream. Could cause hair growth / acne /alteration in liver function.  This dose is not really high enough to do that.